# Patient Record
Sex: FEMALE | Race: WHITE | NOT HISPANIC OR LATINO
[De-identification: names, ages, dates, MRNs, and addresses within clinical notes are randomized per-mention and may not be internally consistent; named-entity substitution may affect disease eponyms.]

---

## 2021-02-03 ENCOUNTER — APPOINTMENT (OUTPATIENT)
Dept: ANTEPARTUM | Facility: CLINIC | Age: 41
End: 2021-02-03
Payer: COMMERCIAL

## 2021-02-03 PROCEDURE — 76817 TRANSVAGINAL US OBSTETRIC: CPT

## 2021-02-03 PROCEDURE — 99072 ADDL SUPL MATRL&STAF TM PHE: CPT

## 2021-02-03 PROCEDURE — 76811 OB US DETAILED SNGL FETUS: CPT

## 2021-02-10 ENCOUNTER — APPOINTMENT (OUTPATIENT)
Dept: ANTEPARTUM | Facility: CLINIC | Age: 41
End: 2021-02-10
Payer: COMMERCIAL

## 2021-02-10 ENCOUNTER — ASOB RESULT (OUTPATIENT)
Age: 41
End: 2021-02-10

## 2021-02-10 PROCEDURE — 76816 OB US FOLLOW-UP PER FETUS: CPT

## 2021-02-10 PROCEDURE — 99072 ADDL SUPL MATRL&STAF TM PHE: CPT

## 2021-03-23 ENCOUNTER — ASOB RESULT (OUTPATIENT)
Age: 41
End: 2021-03-23

## 2021-03-23 ENCOUNTER — APPOINTMENT (OUTPATIENT)
Dept: ANTEPARTUM | Facility: CLINIC | Age: 41
End: 2021-03-23
Payer: COMMERCIAL

## 2021-03-23 PROCEDURE — 76819 FETAL BIOPHYS PROFIL W/O NST: CPT

## 2021-03-23 PROCEDURE — 76816 OB US FOLLOW-UP PER FETUS: CPT

## 2021-03-23 PROCEDURE — 99072 ADDL SUPL MATRL&STAF TM PHE: CPT

## 2021-04-27 ENCOUNTER — ASOB RESULT (OUTPATIENT)
Age: 41
End: 2021-04-27

## 2021-04-27 ENCOUNTER — APPOINTMENT (OUTPATIENT)
Dept: ANTEPARTUM | Facility: CLINIC | Age: 41
End: 2021-04-27
Payer: COMMERCIAL

## 2021-04-27 PROCEDURE — 76816 OB US FOLLOW-UP PER FETUS: CPT

## 2021-04-27 PROCEDURE — 99072 ADDL SUPL MATRL&STAF TM PHE: CPT

## 2021-04-27 PROCEDURE — 76818 FETAL BIOPHYS PROFILE W/NST: CPT

## 2021-04-28 ENCOUNTER — OUTPATIENT (OUTPATIENT)
Dept: OUTPATIENT SERVICES | Facility: HOSPITAL | Age: 41
LOS: 1 days | End: 2021-04-28
Payer: COMMERCIAL

## 2021-04-28 DIAGNOSIS — Z3A.00 WEEKS OF GESTATION OF PREGNANCY NOT SPECIFIED: ICD-10-CM

## 2021-04-28 DIAGNOSIS — O26.899 OTHER SPECIFIED PREGNANCY RELATED CONDITIONS, UNSPECIFIED TRIMESTER: ICD-10-CM

## 2021-04-28 LAB
ALBUMIN SERPL ELPH-MCNC: 3.5 G/DL — SIGNIFICANT CHANGE UP (ref 3.3–5)
ALP SERPL-CCNC: 67 U/L — SIGNIFICANT CHANGE UP (ref 40–120)
ALT FLD-CCNC: 18 U/L — SIGNIFICANT CHANGE UP (ref 10–45)
ANION GAP SERPL CALC-SCNC: 12 MMOL/L — SIGNIFICANT CHANGE UP (ref 5–17)
APPEARANCE UR: CLEAR — SIGNIFICANT CHANGE UP
APTT BLD: 24.5 SEC — LOW (ref 27.5–35.5)
AST SERPL-CCNC: 22 U/L — SIGNIFICANT CHANGE UP (ref 10–40)
BASOPHILS # BLD AUTO: 0.04 K/UL — SIGNIFICANT CHANGE UP (ref 0–0.2)
BASOPHILS NFR BLD AUTO: 0.4 % — SIGNIFICANT CHANGE UP (ref 0–2)
BILIRUB SERPL-MCNC: 0.2 MG/DL — SIGNIFICANT CHANGE UP (ref 0.2–1.2)
BILIRUB UR-MCNC: NEGATIVE — SIGNIFICANT CHANGE UP
BUN SERPL-MCNC: 13 MG/DL — SIGNIFICANT CHANGE UP (ref 7–23)
CALCIUM SERPL-MCNC: 9 MG/DL — SIGNIFICANT CHANGE UP (ref 8.4–10.5)
CHLORIDE SERPL-SCNC: 105 MMOL/L — SIGNIFICANT CHANGE UP (ref 96–108)
CO2 SERPL-SCNC: 21 MMOL/L — LOW (ref 22–31)
COLOR SPEC: YELLOW — SIGNIFICANT CHANGE UP
CREAT ?TM UR-MCNC: 145 MG/DL — SIGNIFICANT CHANGE UP
CREAT SERPL-MCNC: 0.7 MG/DL — SIGNIFICANT CHANGE UP (ref 0.5–1.3)
DIFF PNL FLD: NEGATIVE — SIGNIFICANT CHANGE UP
EOSINOPHIL # BLD AUTO: 0.25 K/UL — SIGNIFICANT CHANGE UP (ref 0–0.5)
EOSINOPHIL NFR BLD AUTO: 2.7 % — SIGNIFICANT CHANGE UP (ref 0–6)
FIBRINOGEN PPP-MCNC: 494 MG/DL — HIGH (ref 258–438)
GLUCOSE SERPL-MCNC: 77 MG/DL — SIGNIFICANT CHANGE UP (ref 70–99)
GLUCOSE UR QL: NEGATIVE — SIGNIFICANT CHANGE UP
HCT VFR BLD CALC: 35.5 % — SIGNIFICANT CHANGE UP (ref 34.5–45)
HGB BLD-MCNC: 11.8 G/DL — SIGNIFICANT CHANGE UP (ref 11.5–15.5)
IMM GRANULOCYTES NFR BLD AUTO: 1.3 % — SIGNIFICANT CHANGE UP (ref 0–1.5)
INR BLD: 0.91 — SIGNIFICANT CHANGE UP (ref 0.88–1.16)
KETONES UR-MCNC: 40 MG/DL
LDH SERPL L TO P-CCNC: 141 U/L — SIGNIFICANT CHANGE UP (ref 50–242)
LEUKOCYTE ESTERASE UR-ACNC: NEGATIVE — SIGNIFICANT CHANGE UP
LYMPHOCYTES # BLD AUTO: 2.28 K/UL — SIGNIFICANT CHANGE UP (ref 1–3.3)
LYMPHOCYTES # BLD AUTO: 24.4 % — SIGNIFICANT CHANGE UP (ref 13–44)
MAGNESIUM SERPL-MCNC: 2 MG/DL — SIGNIFICANT CHANGE UP (ref 1.6–2.6)
MCHC RBC-ENTMCNC: 30.6 PG — SIGNIFICANT CHANGE UP (ref 27–34)
MCHC RBC-ENTMCNC: 33.2 GM/DL — SIGNIFICANT CHANGE UP (ref 32–36)
MCV RBC AUTO: 92.2 FL — SIGNIFICANT CHANGE UP (ref 80–100)
MONOCYTES # BLD AUTO: 0.83 K/UL — SIGNIFICANT CHANGE UP (ref 0–0.9)
MONOCYTES NFR BLD AUTO: 8.9 % — SIGNIFICANT CHANGE UP (ref 2–14)
NEUTROPHILS # BLD AUTO: 5.83 K/UL — SIGNIFICANT CHANGE UP (ref 1.8–7.4)
NEUTROPHILS NFR BLD AUTO: 62.3 % — SIGNIFICANT CHANGE UP (ref 43–77)
NITRITE UR-MCNC: NEGATIVE — SIGNIFICANT CHANGE UP
NRBC # BLD: 0 /100 WBCS — SIGNIFICANT CHANGE UP (ref 0–0)
PH UR: 5.5 — SIGNIFICANT CHANGE UP (ref 5–8)
PHOSPHATE SERPL-MCNC: 4 MG/DL — SIGNIFICANT CHANGE UP (ref 2.5–4.5)
PLATELET # BLD AUTO: 185 K/UL — SIGNIFICANT CHANGE UP (ref 150–400)
POTASSIUM SERPL-MCNC: 4 MMOL/L — SIGNIFICANT CHANGE UP (ref 3.5–5.3)
POTASSIUM SERPL-SCNC: 4 MMOL/L — SIGNIFICANT CHANGE UP (ref 3.5–5.3)
PROT ?TM UR-MCNC: 9 MG/DL — SIGNIFICANT CHANGE UP (ref 0–12)
PROT SERPL-MCNC: 6.5 G/DL — SIGNIFICANT CHANGE UP (ref 6–8.3)
PROT UR-MCNC: NEGATIVE MG/DL — SIGNIFICANT CHANGE UP
PROT/CREAT UR-RTO: 0.1 RATIO — SIGNIFICANT CHANGE UP (ref 0–0.2)
PROTHROM AB SERPL-ACNC: 11 SEC — SIGNIFICANT CHANGE UP (ref 10.6–13.6)
RBC # BLD: 3.85 M/UL — SIGNIFICANT CHANGE UP (ref 3.8–5.2)
RBC # FLD: 12.7 % — SIGNIFICANT CHANGE UP (ref 10.3–14.5)
SODIUM SERPL-SCNC: 138 MMOL/L — SIGNIFICANT CHANGE UP (ref 135–145)
SP GR SPEC: 1.02 — SIGNIFICANT CHANGE UP (ref 1–1.03)
URATE SERPL-MCNC: 3.8 MG/DL — SIGNIFICANT CHANGE UP (ref 2.5–7)
UROBILINOGEN FLD QL: 0.2 E.U./DL — SIGNIFICANT CHANGE UP
WBC # BLD: 9.35 K/UL — SIGNIFICANT CHANGE UP (ref 3.8–10.5)
WBC # FLD AUTO: 9.35 K/UL — SIGNIFICANT CHANGE UP (ref 3.8–10.5)

## 2021-04-28 PROCEDURE — 85610 PROTHROMBIN TIME: CPT

## 2021-04-28 PROCEDURE — 36415 COLL VENOUS BLD VENIPUNCTURE: CPT

## 2021-04-28 PROCEDURE — 84100 ASSAY OF PHOSPHORUS: CPT

## 2021-04-28 PROCEDURE — 83615 LACTATE (LD) (LDH) ENZYME: CPT

## 2021-04-28 PROCEDURE — U0003: CPT

## 2021-04-28 PROCEDURE — 99214 OFFICE O/P EST MOD 30 MIN: CPT

## 2021-04-28 PROCEDURE — 80053 COMPREHEN METABOLIC PANEL: CPT

## 2021-04-28 PROCEDURE — 84156 ASSAY OF PROTEIN URINE: CPT

## 2021-04-28 PROCEDURE — 85384 FIBRINOGEN ACTIVITY: CPT

## 2021-04-28 PROCEDURE — U0005: CPT

## 2021-04-28 PROCEDURE — 83735 ASSAY OF MAGNESIUM: CPT

## 2021-04-28 PROCEDURE — 82570 ASSAY OF URINE CREATININE: CPT

## 2021-04-28 PROCEDURE — 85730 THROMBOPLASTIN TIME PARTIAL: CPT

## 2021-04-28 PROCEDURE — 81003 URINALYSIS AUTO W/O SCOPE: CPT

## 2021-04-28 PROCEDURE — 84550 ASSAY OF BLOOD/URIC ACID: CPT

## 2021-04-28 PROCEDURE — 85025 COMPLETE CBC W/AUTO DIFF WBC: CPT

## 2021-04-28 RX ORDER — SODIUM CHLORIDE 9 MG/ML
1000 INJECTION, SOLUTION INTRAVENOUS
Refills: 0 | Status: DISCONTINUED | OUTPATIENT
Start: 2021-04-28 | End: 2021-05-13

## 2021-04-28 RX ORDER — FAMOTIDINE 10 MG/ML
20 INJECTION INTRAVENOUS ONCE
Refills: 0 | Status: COMPLETED | OUTPATIENT
Start: 2021-04-28 | End: 2021-04-28

## 2021-04-28 RX ADMIN — FAMOTIDINE 20 MILLIGRAM(S): 10 INJECTION INTRAVENOUS at 21:24

## 2021-04-28 RX ADMIN — SODIUM CHLORIDE 1000 MILLILITER(S): 9 INJECTION, SOLUTION INTRAVENOUS at 21:25

## 2021-04-29 LAB — SARS-COV-2 RNA SPEC QL NAA+PROBE: SIGNIFICANT CHANGE UP

## 2021-05-12 ENCOUNTER — ASOB RESULT (OUTPATIENT)
Age: 41
End: 2021-05-12

## 2021-05-12 ENCOUNTER — APPOINTMENT (OUTPATIENT)
Dept: ANTEPARTUM | Facility: CLINIC | Age: 41
End: 2021-05-12
Payer: COMMERCIAL

## 2021-05-12 PROCEDURE — 99072 ADDL SUPL MATRL&STAF TM PHE: CPT

## 2021-05-12 PROCEDURE — 76819 FETAL BIOPHYS PROFIL W/O NST: CPT

## 2021-05-12 PROCEDURE — 76816 OB US FOLLOW-UP PER FETUS: CPT

## 2021-05-23 ENCOUNTER — OUTPATIENT (OUTPATIENT)
Dept: OUTPATIENT SERVICES | Facility: HOSPITAL | Age: 41
LOS: 1 days | End: 2021-05-23
Payer: COMMERCIAL

## 2021-05-23 DIAGNOSIS — O26.899 OTHER SPECIFIED PREGNANCY RELATED CONDITIONS, UNSPECIFIED TRIMESTER: ICD-10-CM

## 2021-05-23 DIAGNOSIS — Z3A.00 WEEKS OF GESTATION OF PREGNANCY NOT SPECIFIED: ICD-10-CM

## 2021-05-26 ENCOUNTER — APPOINTMENT (OUTPATIENT)
Dept: ANTEPARTUM | Facility: CLINIC | Age: 41
End: 2021-05-26
Payer: COMMERCIAL

## 2021-05-26 ENCOUNTER — ASOB RESULT (OUTPATIENT)
Age: 41
End: 2021-05-26

## 2021-05-26 PROCEDURE — 99072 ADDL SUPL MATRL&STAF TM PHE: CPT

## 2021-05-26 PROCEDURE — 76818 FETAL BIOPHYS PROFILE W/NST: CPT

## 2021-05-26 PROCEDURE — 76816 OB US FOLLOW-UP PER FETUS: CPT

## 2021-05-27 ENCOUNTER — INPATIENT (INPATIENT)
Facility: HOSPITAL | Age: 41
LOS: 3 days | Discharge: HOME | End: 2021-05-31
Attending: OBSTETRICS & GYNECOLOGY | Admitting: OBSTETRICS & GYNECOLOGY
Payer: COMMERCIAL

## 2021-05-27 VITALS
RESPIRATION RATE: 18 BRPM | SYSTOLIC BLOOD PRESSURE: 119 MMHG | OXYGEN SATURATION: 98 % | HEART RATE: 63 BPM | TEMPERATURE: 98 F | DIASTOLIC BLOOD PRESSURE: 67 MMHG

## 2021-05-27 LAB
ALBUMIN SERPL ELPH-MCNC: 3.3 G/DL — SIGNIFICANT CHANGE UP (ref 3.3–5)
ALP SERPL-CCNC: 81 U/L — SIGNIFICANT CHANGE UP (ref 40–120)
ALT FLD-CCNC: 14 U/L — SIGNIFICANT CHANGE UP (ref 10–45)
ANION GAP SERPL CALC-SCNC: 11 MMOL/L — SIGNIFICANT CHANGE UP (ref 5–17)
APPEARANCE UR: CLEAR — SIGNIFICANT CHANGE UP
APTT BLD: 25.6 SEC — LOW (ref 27.5–35.5)
AST SERPL-CCNC: 26 U/L — SIGNIFICANT CHANGE UP (ref 10–40)
BASOPHILS # BLD AUTO: 0.05 K/UL — SIGNIFICANT CHANGE UP (ref 0–0.2)
BASOPHILS NFR BLD AUTO: 0.6 % — SIGNIFICANT CHANGE UP (ref 0–2)
BILIRUB SERPL-MCNC: 0.2 MG/DL — SIGNIFICANT CHANGE UP (ref 0.2–1.2)
BILIRUB UR-MCNC: NEGATIVE — SIGNIFICANT CHANGE UP
BUN SERPL-MCNC: 9 MG/DL — SIGNIFICANT CHANGE UP (ref 7–23)
CALCIUM SERPL-MCNC: 8.8 MG/DL — SIGNIFICANT CHANGE UP (ref 8.4–10.5)
CHLORIDE SERPL-SCNC: 108 MMOL/L — SIGNIFICANT CHANGE UP (ref 96–108)
CO2 SERPL-SCNC: 21 MMOL/L — LOW (ref 22–31)
COLOR SPEC: YELLOW — SIGNIFICANT CHANGE UP
CREAT SERPL-MCNC: 0.67 MG/DL — SIGNIFICANT CHANGE UP (ref 0.5–1.3)
DIFF PNL FLD: NEGATIVE — SIGNIFICANT CHANGE UP
EOSINOPHIL # BLD AUTO: 0.33 K/UL — SIGNIFICANT CHANGE UP (ref 0–0.5)
EOSINOPHIL NFR BLD AUTO: 3.8 % — SIGNIFICANT CHANGE UP (ref 0–6)
FIBRINOGEN PPP-MCNC: 442 MG/DL — HIGH (ref 258–438)
GLUCOSE SERPL-MCNC: 61 MG/DL — LOW (ref 70–99)
GLUCOSE UR QL: NEGATIVE — SIGNIFICANT CHANGE UP
HCT VFR BLD CALC: 37.6 % — SIGNIFICANT CHANGE UP (ref 34.5–45)
HGB BLD-MCNC: 12.4 G/DL — SIGNIFICANT CHANGE UP (ref 11.5–15.5)
IMM GRANULOCYTES NFR BLD AUTO: 0.6 % — SIGNIFICANT CHANGE UP (ref 0–1.5)
KETONES UR-MCNC: NEGATIVE — SIGNIFICANT CHANGE UP
LEUKOCYTE ESTERASE UR-ACNC: NEGATIVE — SIGNIFICANT CHANGE UP
LYMPHOCYTES # BLD AUTO: 2.53 K/UL — SIGNIFICANT CHANGE UP (ref 1–3.3)
LYMPHOCYTES # BLD AUTO: 28.9 % — SIGNIFICANT CHANGE UP (ref 13–44)
MCHC RBC-ENTMCNC: 30.7 PG — SIGNIFICANT CHANGE UP (ref 27–34)
MCHC RBC-ENTMCNC: 33 GM/DL — SIGNIFICANT CHANGE UP (ref 32–36)
MCV RBC AUTO: 93.1 FL — SIGNIFICANT CHANGE UP (ref 80–100)
MONOCYTES # BLD AUTO: 0.75 K/UL — SIGNIFICANT CHANGE UP (ref 0–0.9)
MONOCYTES NFR BLD AUTO: 8.6 % — SIGNIFICANT CHANGE UP (ref 2–14)
NEUTROPHILS # BLD AUTO: 5.04 K/UL — SIGNIFICANT CHANGE UP (ref 1.8–7.4)
NEUTROPHILS NFR BLD AUTO: 57.5 % — SIGNIFICANT CHANGE UP (ref 43–77)
NITRITE UR-MCNC: NEGATIVE — SIGNIFICANT CHANGE UP
NRBC # BLD: 0 /100 WBCS — SIGNIFICANT CHANGE UP (ref 0–0)
PH UR: 6.5 — SIGNIFICANT CHANGE UP (ref 5–8)
PLATELET # BLD AUTO: 172 K/UL — SIGNIFICANT CHANGE UP (ref 150–400)
POTASSIUM SERPL-MCNC: 3.9 MMOL/L — SIGNIFICANT CHANGE UP (ref 3.5–5.3)
POTASSIUM SERPL-SCNC: 3.9 MMOL/L — SIGNIFICANT CHANGE UP (ref 3.5–5.3)
PROT SERPL-MCNC: 6.1 G/DL — SIGNIFICANT CHANGE UP (ref 6–8.3)
PROT UR-MCNC: NEGATIVE MG/DL — SIGNIFICANT CHANGE UP
RBC # BLD: 4.04 M/UL — SIGNIFICANT CHANGE UP (ref 3.8–5.2)
RBC # FLD: 12.6 % — SIGNIFICANT CHANGE UP (ref 10.3–14.5)
RH IG SCN BLD-IMP: POSITIVE — SIGNIFICANT CHANGE UP
SARS-COV-2 RNA SPEC QL NAA+PROBE: SIGNIFICANT CHANGE UP
SODIUM SERPL-SCNC: 140 MMOL/L — SIGNIFICANT CHANGE UP (ref 135–145)
SP GR SPEC: <=1.005 — SIGNIFICANT CHANGE UP (ref 1–1.03)
URATE SERPL-MCNC: 3.6 MG/DL — SIGNIFICANT CHANGE UP (ref 2.5–7)
UROBILINOGEN FLD QL: 0.2 E.U./DL — SIGNIFICANT CHANGE UP
WBC # BLD: 8.75 K/UL — SIGNIFICANT CHANGE UP (ref 3.8–10.5)
WBC # FLD AUTO: 8.75 K/UL — SIGNIFICANT CHANGE UP (ref 3.8–10.5)

## 2021-05-27 PROCEDURE — U0003: CPT

## 2021-05-27 PROCEDURE — U0005: CPT

## 2021-05-27 PROCEDURE — 99214 OFFICE O/P EST MOD 30 MIN: CPT

## 2021-05-27 RX ORDER — ASPIRIN/CALCIUM CARB/MAGNESIUM 324 MG
81 TABLET ORAL ONCE
Refills: 0 | Status: DISCONTINUED | OUTPATIENT
Start: 2021-05-27 | End: 2021-06-07

## 2021-05-27 RX ORDER — SODIUM CHLORIDE 9 MG/ML
1000 INJECTION, SOLUTION INTRAVENOUS
Refills: 0 | Status: DISCONTINUED | OUTPATIENT
Start: 2021-05-27 | End: 2021-05-27

## 2021-05-27 RX ORDER — SODIUM CHLORIDE 9 MG/ML
1000 INJECTION, SOLUTION INTRAVENOUS
Refills: 0 | Status: DISCONTINUED | OUTPATIENT
Start: 2021-05-27 | End: 2021-05-28

## 2021-05-27 RX ORDER — SODIUM CHLORIDE 9 MG/ML
1000 INJECTION, SOLUTION INTRAVENOUS
Refills: 0 | Status: DISCONTINUED | OUTPATIENT
Start: 2021-05-27 | End: 2021-06-07

## 2021-05-27 RX ORDER — ASPIRIN/CALCIUM CARB/MAGNESIUM 324 MG
81 TABLET ORAL DAILY
Refills: 0 | Status: DISCONTINUED | OUTPATIENT
Start: 2021-05-27 | End: 2021-05-28

## 2021-05-27 RX ORDER — FERROUS SULFATE 325(65) MG
325 TABLET ORAL ONCE
Refills: 0 | Status: DISCONTINUED | OUTPATIENT
Start: 2021-05-27 | End: 2021-06-07

## 2021-05-27 RX ADMIN — SODIUM CHLORIDE 125 MILLILITER(S): 9 INJECTION, SOLUTION INTRAVENOUS at 18:24

## 2021-05-27 NOTE — PATIENT PROFILE OB - NS AS LABOR AMNIOTIC FLD COLOR OB_ANTEPARTUM
[FreeTextEntry1] : Plan of care reviewed with practitioner and I concur with the assessment.\par 
clear

## 2021-05-27 NOTE — PATIENT PROFILE OB - NS_SUPPORTPERSONNAME_OBGYN_ALL_OB_FT
Patient/Caregiver provided printed discharge information.
Henri Obando (received Pfeizer COVID vaccine x 2, 2nd dose > 2 weeks ago)

## 2021-05-28 ENCOUNTER — TRANSCRIPTION ENCOUNTER (OUTPATIENT)
Age: 41
End: 2021-05-28

## 2021-05-28 ENCOUNTER — APPOINTMENT (OUTPATIENT)
Dept: ANTEPARTUM | Facility: CLINIC | Age: 41
End: 2021-05-28

## 2021-05-28 LAB
COVID-19 SPIKE DOMAIN AB INTERP: POSITIVE
COVID-19 SPIKE DOMAIN ANTIBODY RESULT: >250 U/ML — HIGH
SARS-COV-2 IGG+IGM SERPL QL IA: >250 U/ML — HIGH
SARS-COV-2 IGG+IGM SERPL QL IA: POSITIVE
T PALLIDUM AB TITR SER: NEGATIVE — SIGNIFICANT CHANGE UP

## 2021-05-28 RX ORDER — FAMOTIDINE 10 MG/ML
20 INJECTION INTRAVENOUS ONCE
Refills: 0 | Status: DISCONTINUED | OUTPATIENT
Start: 2021-05-28 | End: 2021-05-31

## 2021-05-28 RX ORDER — CALCIUM CARBONATE 500(1250)
2 TABLET ORAL ONCE
Refills: 0 | Status: COMPLETED | OUTPATIENT
Start: 2021-05-28 | End: 2021-05-28

## 2021-05-28 RX ORDER — SODIUM CHLORIDE 9 MG/ML
1000 INJECTION, SOLUTION INTRAVENOUS
Refills: 0 | Status: DISCONTINUED | OUTPATIENT
Start: 2021-05-28 | End: 2021-05-29

## 2021-05-28 RX ADMIN — Medication 81 MILLIGRAM(S): at 12:32

## 2021-05-28 RX ADMIN — Medication 2 TABLET(S): at 12:34

## 2021-05-28 RX ADMIN — SODIUM CHLORIDE 70 MILLILITER(S): 9 INJECTION, SOLUTION INTRAVENOUS at 18:14

## 2021-05-28 RX ADMIN — Medication 1 TABLET(S): at 12:31

## 2021-05-28 RX ADMIN — SODIUM CHLORIDE 125 MILLILITER(S): 9 INJECTION, SOLUTION INTRAVENOUS at 10:00

## 2021-05-29 ENCOUNTER — RESULT REVIEW (OUTPATIENT)
Age: 41
End: 2021-05-29

## 2021-05-29 PROCEDURE — 88307 TISSUE EXAM BY PATHOLOGIST: CPT | Mod: 26

## 2021-05-29 RX ORDER — DIPHENHYDRAMINE HCL 50 MG
25 CAPSULE ORAL EVERY 6 HOURS
Refills: 0 | Status: DISCONTINUED | OUTPATIENT
Start: 2021-05-29 | End: 2021-05-31

## 2021-05-29 RX ORDER — MAGNESIUM HYDROXIDE 400 MG/1
30 TABLET, CHEWABLE ORAL
Refills: 0 | Status: DISCONTINUED | OUTPATIENT
Start: 2021-05-29 | End: 2021-05-31

## 2021-05-29 RX ORDER — OXYCODONE HYDROCHLORIDE 5 MG/1
5 TABLET ORAL ONCE
Refills: 0 | Status: DISCONTINUED | OUTPATIENT
Start: 2021-05-29 | End: 2021-05-31

## 2021-05-29 RX ORDER — LANOLIN
1 OINTMENT (GRAM) TOPICAL EVERY 6 HOURS
Refills: 0 | Status: DISCONTINUED | OUTPATIENT
Start: 2021-05-29 | End: 2021-05-31

## 2021-05-29 RX ORDER — ACETAMINOPHEN 500 MG
975 TABLET ORAL
Refills: 0 | Status: DISCONTINUED | OUTPATIENT
Start: 2021-05-29 | End: 2021-05-31

## 2021-05-29 RX ORDER — KETOROLAC TROMETHAMINE 30 MG/ML
30 SYRINGE (ML) INJECTION EVERY 6 HOURS
Refills: 0 | Status: DISCONTINUED | OUTPATIENT
Start: 2021-05-29 | End: 2021-05-30

## 2021-05-29 RX ORDER — ONDANSETRON 8 MG/1
4 TABLET, FILM COATED ORAL EVERY 6 HOURS
Refills: 0 | Status: DISCONTINUED | OUTPATIENT
Start: 2021-05-29 | End: 2021-05-31

## 2021-05-29 RX ORDER — TETANUS TOXOID, REDUCED DIPHTHERIA TOXOID AND ACELLULAR PERTUSSIS VACCINE, ADSORBED 5; 2.5; 8; 8; 2.5 [IU]/.5ML; [IU]/.5ML; UG/.5ML; UG/.5ML; UG/.5ML
0.5 SUSPENSION INTRAMUSCULAR ONCE
Refills: 0 | Status: DISCONTINUED | OUTPATIENT
Start: 2021-05-29 | End: 2021-05-31

## 2021-05-29 RX ORDER — OXYCODONE HYDROCHLORIDE 5 MG/1
5 TABLET ORAL
Refills: 0 | Status: DISCONTINUED | OUTPATIENT
Start: 2021-05-29 | End: 2021-05-31

## 2021-05-29 RX ORDER — NALOXONE HYDROCHLORIDE 4 MG/.1ML
0.1 SPRAY NASAL
Refills: 0 | Status: DISCONTINUED | OUTPATIENT
Start: 2021-05-29 | End: 2021-05-31

## 2021-05-29 RX ORDER — SODIUM CHLORIDE 9 MG/ML
1000 INJECTION, SOLUTION INTRAVENOUS ONCE
Refills: 0 | Status: COMPLETED | OUTPATIENT
Start: 2021-05-29 | End: 2021-05-29

## 2021-05-29 RX ORDER — OXYTOCIN 10 UNIT/ML
333.33 VIAL (ML) INJECTION
Qty: 20 | Refills: 0 | Status: DISCONTINUED | OUTPATIENT
Start: 2021-05-29 | End: 2021-05-29

## 2021-05-29 RX ORDER — CEFAZOLIN SODIUM 1 G
2000 VIAL (EA) INJECTION ONCE
Refills: 0 | Status: COMPLETED | OUTPATIENT
Start: 2021-05-29 | End: 2021-05-29

## 2021-05-29 RX ORDER — DEXAMETHASONE 0.5 MG/5ML
4 ELIXIR ORAL EVERY 6 HOURS
Refills: 0 | Status: DISCONTINUED | OUTPATIENT
Start: 2021-05-29 | End: 2021-05-31

## 2021-05-29 RX ORDER — OXYTOCIN 10 UNIT/ML
333.33 VIAL (ML) INJECTION
Qty: 20 | Refills: 0 | Status: DISCONTINUED | OUTPATIENT
Start: 2021-05-29 | End: 2021-05-31

## 2021-05-29 RX ORDER — SODIUM CHLORIDE 9 MG/ML
1000 INJECTION, SOLUTION INTRAVENOUS ONCE
Refills: 0 | Status: DISCONTINUED | OUTPATIENT
Start: 2021-05-29 | End: 2021-05-29

## 2021-05-29 RX ORDER — MORPHINE SULFATE 50 MG/1
0.2 CAPSULE, EXTENDED RELEASE ORAL ONCE
Refills: 0 | Status: DISCONTINUED | OUTPATIENT
Start: 2021-05-29 | End: 2021-05-31

## 2021-05-29 RX ORDER — KETOROLAC TROMETHAMINE 30 MG/ML
30 SYRINGE (ML) INJECTION ONCE
Refills: 0 | Status: DISCONTINUED | OUTPATIENT
Start: 2021-05-29 | End: 2021-05-29

## 2021-05-29 RX ORDER — ENOXAPARIN SODIUM 100 MG/ML
40 INJECTION SUBCUTANEOUS EVERY 24 HOURS
Refills: 0 | Status: DISCONTINUED | OUTPATIENT
Start: 2021-05-30 | End: 2021-05-31

## 2021-05-29 RX ORDER — SODIUM CHLORIDE 9 MG/ML
1000 INJECTION, SOLUTION INTRAVENOUS
Refills: 0 | Status: DISCONTINUED | OUTPATIENT
Start: 2021-05-29 | End: 2021-05-31

## 2021-05-29 RX ORDER — SIMETHICONE 80 MG/1
80 TABLET, CHEWABLE ORAL EVERY 4 HOURS
Refills: 0 | Status: DISCONTINUED | OUTPATIENT
Start: 2021-05-29 | End: 2021-05-31

## 2021-05-29 RX ORDER — IBUPROFEN 200 MG
600 TABLET ORAL EVERY 6 HOURS
Refills: 0 | Status: COMPLETED | OUTPATIENT
Start: 2021-05-29 | End: 2022-04-27

## 2021-05-29 RX ORDER — CITRIC ACID/SODIUM CITRATE 300-500 MG
30 SOLUTION, ORAL ORAL ONCE
Refills: 0 | Status: COMPLETED | OUTPATIENT
Start: 2021-05-29 | End: 2021-05-29

## 2021-05-29 RX ORDER — CHLORHEXIDINE GLUCONATE 213 G/1000ML
2 SOLUTION TOPICAL ONCE
Refills: 0 | Status: DISCONTINUED | OUTPATIENT
Start: 2021-05-29 | End: 2021-05-31

## 2021-05-29 RX ADMIN — Medication 30 MILLILITER(S): at 10:21

## 2021-05-29 RX ADMIN — Medication 30 MILLIGRAM(S): at 14:00

## 2021-05-29 RX ADMIN — Medication 30 MILLIGRAM(S): at 21:28

## 2021-05-29 RX ADMIN — Medication 30 MILLIGRAM(S): at 22:00

## 2021-05-29 RX ADMIN — Medication 100 MILLIGRAM(S): at 10:22

## 2021-05-29 RX ADMIN — SIMETHICONE 80 MILLIGRAM(S): 80 TABLET, CHEWABLE ORAL at 18:41

## 2021-05-29 RX ADMIN — SODIUM CHLORIDE 1000 MILLILITER(S): 9 INJECTION, SOLUTION INTRAVENOUS at 08:50

## 2021-05-29 RX ADMIN — Medication 975 MILLIGRAM(S): at 19:10

## 2021-05-29 RX ADMIN — Medication 975 MILLIGRAM(S): at 18:40

## 2021-05-30 LAB
BASOPHILS # BLD AUTO: 0.05 K/UL — SIGNIFICANT CHANGE UP (ref 0–0.2)
BASOPHILS NFR BLD AUTO: 0.4 % — SIGNIFICANT CHANGE UP (ref 0–2)
EOSINOPHIL # BLD AUTO: 0.1 K/UL — SIGNIFICANT CHANGE UP (ref 0–0.5)
EOSINOPHIL NFR BLD AUTO: 0.8 % — SIGNIFICANT CHANGE UP (ref 0–6)
HCT VFR BLD CALC: 32 % — LOW (ref 34.5–45)
HGB BLD-MCNC: 10.7 G/DL — LOW (ref 11.5–15.5)
IMM GRANULOCYTES NFR BLD AUTO: 0.8 % — SIGNIFICANT CHANGE UP (ref 0–1.5)
LYMPHOCYTES # BLD AUTO: 19.5 % — SIGNIFICANT CHANGE UP (ref 13–44)
LYMPHOCYTES # BLD AUTO: 2.59 K/UL — SIGNIFICANT CHANGE UP (ref 1–3.3)
MCHC RBC-ENTMCNC: 30.5 PG — SIGNIFICANT CHANGE UP (ref 27–34)
MCHC RBC-ENTMCNC: 33.4 GM/DL — SIGNIFICANT CHANGE UP (ref 32–36)
MCV RBC AUTO: 91.2 FL — SIGNIFICANT CHANGE UP (ref 80–100)
MONOCYTES # BLD AUTO: 1.07 K/UL — HIGH (ref 0–0.9)
MONOCYTES NFR BLD AUTO: 8.1 % — SIGNIFICANT CHANGE UP (ref 2–14)
NEUTROPHILS # BLD AUTO: 9.35 K/UL — HIGH (ref 1.8–7.4)
NEUTROPHILS NFR BLD AUTO: 70.4 % — SIGNIFICANT CHANGE UP (ref 43–77)
NRBC # BLD: 0 /100 WBCS — SIGNIFICANT CHANGE UP (ref 0–0)
PLATELET # BLD AUTO: 176 K/UL — SIGNIFICANT CHANGE UP (ref 150–400)
RBC # BLD: 3.51 M/UL — LOW (ref 3.8–5.2)
RBC # FLD: 12.7 % — SIGNIFICANT CHANGE UP (ref 10.3–14.5)
WBC # BLD: 13.27 K/UL — HIGH (ref 3.8–10.5)
WBC # FLD AUTO: 13.27 K/UL — HIGH (ref 3.8–10.5)

## 2021-05-30 RX ORDER — IBUPROFEN 200 MG
600 TABLET ORAL EVERY 6 HOURS
Refills: 0 | Status: DISCONTINUED | OUTPATIENT
Start: 2021-05-30 | End: 2021-05-31

## 2021-05-30 RX ADMIN — Medication 975 MILLIGRAM(S): at 11:42

## 2021-05-30 RX ADMIN — Medication 600 MILLIGRAM(S): at 14:50

## 2021-05-30 RX ADMIN — Medication 600 MILLIGRAM(S): at 22:00

## 2021-05-30 RX ADMIN — Medication 975 MILLIGRAM(S): at 12:42

## 2021-05-30 RX ADMIN — Medication 975 MILLIGRAM(S): at 00:24

## 2021-05-30 RX ADMIN — Medication 975 MILLIGRAM(S): at 01:14

## 2021-05-30 RX ADMIN — Medication 30 MILLIGRAM(S): at 08:41

## 2021-05-30 RX ADMIN — SIMETHICONE 80 MILLIGRAM(S): 80 TABLET, CHEWABLE ORAL at 00:25

## 2021-05-30 RX ADMIN — Medication 600 MILLIGRAM(S): at 13:59

## 2021-05-30 RX ADMIN — Medication 975 MILLIGRAM(S): at 06:18

## 2021-05-30 RX ADMIN — Medication 1 APPLICATION(S): at 06:20

## 2021-05-30 RX ADMIN — Medication 600 MILLIGRAM(S): at 21:05

## 2021-05-30 RX ADMIN — Medication 30 MILLIGRAM(S): at 09:28

## 2021-05-30 RX ADMIN — SIMETHICONE 80 MILLIGRAM(S): 80 TABLET, CHEWABLE ORAL at 06:19

## 2021-05-30 RX ADMIN — Medication 30 MILLIGRAM(S): at 03:33

## 2021-05-30 RX ADMIN — Medication 30 MILLIGRAM(S): at 04:00

## 2021-05-30 RX ADMIN — Medication 975 MILLIGRAM(S): at 18:15

## 2021-05-30 RX ADMIN — Medication 975 MILLIGRAM(S): at 17:28

## 2021-05-30 RX ADMIN — Medication 975 MILLIGRAM(S): at 07:00

## 2021-05-30 RX ADMIN — ENOXAPARIN SODIUM 40 MILLIGRAM(S): 100 INJECTION SUBCUTANEOUS at 07:02

## 2021-05-30 NOTE — LACTATION INITIAL EVALUATION - NS LACT CON REASON FOR REQ
37 wk gestation baby girl, seen just under 24 hrs of life. Baby presents with 3% wt loss and has had 4 wet and 3 dirty diapers since birth. Baby placed skin to skin on mothers chest and began rooting towards left breast. In a laid-back cradle hold, baby deeply attached, took a few strong sucks and then became sleepy. Baby continues to be sleepy at breast but has ocassional suck cycles with stim and swallows audible during those times. Mother confirms a strong pull of the nipple and denies discomfort. Complete breastfeeding education was provided to primip mom, manual expression technique taught with proper return demo and benefits of doing so explained. Colostrum easily expressible bilaterally and drops collected onto spoon to be offered to the baby, and mother advised to perform hand expression in addition to feeds at breast to help meet babies nutritional needs due to gestational age.  Responsive/ frequent feeds, continued/ increased SSC and rooming-in were encouraged. All questions were answered, mother verbalized understanding of teaching and info given./primaparous mom

## 2021-05-30 NOTE — PROGRESS NOTE ADULT - ATTENDING COMMENTS
Patient seen and examined at bedside. Patient has no current complaints. Not yet passing gas, has voided and is ambulating. Continue routine postop care. Patient to be discharge POD2 or 3 if patient remains hemodynamically stable

## 2021-05-31 ENCOUNTER — TRANSCRIPTION ENCOUNTER (OUTPATIENT)
Age: 41
End: 2021-05-31

## 2021-05-31 VITALS
HEART RATE: 76 BPM | TEMPERATURE: 98 F | DIASTOLIC BLOOD PRESSURE: 77 MMHG | SYSTOLIC BLOOD PRESSURE: 117 MMHG | OXYGEN SATURATION: 97 %

## 2021-05-31 PROCEDURE — 85025 COMPLETE CBC W/AUTO DIFF WBC: CPT

## 2021-05-31 PROCEDURE — 86900 BLOOD TYPING SEROLOGIC ABO: CPT

## 2021-05-31 PROCEDURE — 86780 TREPONEMA PALLIDUM: CPT

## 2021-05-31 PROCEDURE — 81003 URINALYSIS AUTO W/O SCOPE: CPT

## 2021-05-31 PROCEDURE — 80053 COMPREHEN METABOLIC PANEL: CPT

## 2021-05-31 PROCEDURE — 88307 TISSUE EXAM BY PATHOLOGIST: CPT

## 2021-05-31 PROCEDURE — 85384 FIBRINOGEN ACTIVITY: CPT

## 2021-05-31 PROCEDURE — 84550 ASSAY OF BLOOD/URIC ACID: CPT

## 2021-05-31 PROCEDURE — 86850 RBC ANTIBODY SCREEN: CPT

## 2021-05-31 PROCEDURE — 85730 THROMBOPLASTIN TIME PARTIAL: CPT

## 2021-05-31 PROCEDURE — 36415 COLL VENOUS BLD VENIPUNCTURE: CPT

## 2021-05-31 PROCEDURE — 86901 BLOOD TYPING SEROLOGIC RH(D): CPT

## 2021-05-31 PROCEDURE — 86769 SARS-COV-2 COVID-19 ANTIBODY: CPT

## 2021-05-31 RX ORDER — ACETAMINOPHEN 500 MG
3 TABLET ORAL
Qty: 0 | Refills: 0 | DISCHARGE
Start: 2021-05-31

## 2021-05-31 RX ORDER — IBUPROFEN 200 MG
1 TABLET ORAL
Qty: 0 | Refills: 0 | DISCHARGE
Start: 2021-05-31

## 2021-05-31 RX ADMIN — Medication 975 MILLIGRAM(S): at 11:44

## 2021-05-31 RX ADMIN — Medication 975 MILLIGRAM(S): at 05:55

## 2021-05-31 RX ADMIN — Medication 975 MILLIGRAM(S): at 00:04

## 2021-05-31 RX ADMIN — Medication 975 MILLIGRAM(S): at 06:55

## 2021-05-31 RX ADMIN — Medication 600 MILLIGRAM(S): at 03:07

## 2021-05-31 RX ADMIN — ENOXAPARIN SODIUM 40 MILLIGRAM(S): 100 INJECTION SUBCUTANEOUS at 07:12

## 2021-05-31 RX ADMIN — Medication 600 MILLIGRAM(S): at 08:34

## 2021-05-31 RX ADMIN — Medication 600 MILLIGRAM(S): at 04:00

## 2021-05-31 RX ADMIN — Medication 600 MILLIGRAM(S): at 09:30

## 2021-05-31 RX ADMIN — Medication 975 MILLIGRAM(S): at 01:00

## 2021-05-31 NOTE — DISCHARGE NOTE OB - CARE PROVIDER_API CALL
Carolyne Ramirez (DO; MS)  [facility]  [facility] [address] [place] [address]  Phone: [phone]  Fax: [phone]  Follow Up Time:

## 2021-05-31 NOTE — DISCHARGE NOTE OB - PLAN OF CARE
postpartum care 41y female s/p  section, post operative day 2, meeting all postpartum milestones. No heavy lifting. Pelvic rest until cleared. Follow-up with obstetrician in 1-2 weeks.

## 2021-05-31 NOTE — DISCHARGE NOTE OB - PATIENT PORTAL LINK FT
You can access the FollowMyHealth Patient Portal offered by [facility] by registering at the following website: [email or web address]. By joining [facility]’s FollowMyHealth portal, you will also be able to view your health information using other applications (apps) compatible with our system.

## 2021-05-31 NOTE — DISCHARGE NOTE OB - CARE PLAN
Principal Discharge DX:	Postpartum state  Goal:	postpartum care  Assessment and plan of treatment:	41y female s/p  section, post operative day 2, meeting all postpartum milestones. No heavy lifting. Pelvic rest until cleared. Follow-up with obstetrician in 1-2 weeks.

## 2021-05-31 NOTE — PROGRESS NOTE ADULT - ASSESSMENT
41y Female POD#1    s/p C/S, Uncomplicated post operative Hgb 10.7                                      1. Neuro/Pain:  OPM  2  CV:  VS per routine  3. Pulm: Encourage ISS & Ambulation  4. GI:  Reg  5. : Voiding  6. DVT ppx: SCDs, lovenox qD  7. Dispo: POD #2 or 3
41y Female POD#2   s/p C/S, Uncomplicated post operative Hgb 10.7                                      1. Neuro/Pain:  OPM  2  CV:  VS per routine  3. Pulm: Encourage ISS & Ambulation  4. GI:  Reg  5. : Voiding  6. DVT ppx: SCDs, lovenox qD  7. Dispo: POD #2 or 3

## 2021-05-31 NOTE — PROGRESS NOTE ADULT - SUBJECTIVE AND OBJECTIVE BOX
Patient evaluated at bedside.   She reports pain is well controlled with OPM.  She has been ambulating without assistance, voiding spontaneously, passing gas, tolerating regular diet    She denies HA, dizziness, CP, palpitations, SOB, n/v, or heavy vaginal bleeding.    Physical Exam:  Vital Signs Last 24 Hrs  T(C): 36.7 (31 May 2021 06:03), Max: 36.8 (30 May 2021 22:55)  T(F): 98.1 (31 May 2021 06:03), Max: 98.3 (30 May 2021 22:55)  HR: 76 (31 May 2021 06:03) (67 - 76)  BP: 117/77 (31 May 2021 06:03) (107/68 - 117/77)  BP(mean): --  RR: 17 (30 May 2021 22:55) (17 - 18)  SpO2: 97% (31 May 2021 06:03) (97% - 98%)    Gen: NAD  Abd: + BS, soft, nontender, nondistended, no rebound or guarding  Incision clean, dry and intact  uterus firm at midline  : lochia WNL  Extremities: no swelling or calf tenderness                          10.7   13.27 )-----------( 176      ( 30 May 2021 06:24 )             32.0       MEDICATIONS  (STANDING):  acetaminophen   Tablet .. 975 milliGRAM(s) Oral <User Schedule>  chlorhexidine 2% Cloths 2 Application(s) Topical once  diphtheria/tetanus/pertussis (acellular) Vaccine (ADAcel) 0.5 milliLiter(s) IntraMuscular once  enoxaparin Injectable 40 milliGRAM(s) SubCutaneous every 24 hours  famotidine Injectable 20 milliGRAM(s) IV Push once  ibuprofen  Tablet. 600 milliGRAM(s) Oral every 6 hours  lactated ringers. 1000 milliLiter(s) (125 mL/Hr) IV Continuous <Continuous>  morphine PF Spinal 0.2 milliGRAM(s) IntraThecal. once  oxytocin Infusion 333.333 milliUNIT(s)/Min (1000 mL/Hr) IV Continuous <Continuous>  prenatal multivitamin 1 Tablet(s) Oral daily    MEDICATIONS  (PRN):  dexAMETHasone  Injectable 4 milliGRAM(s) IV Push every 6 hours PRN Nausea  diphenhydrAMINE 25 milliGRAM(s) Oral every 6 hours PRN Pruritus  lanolin Ointment 1 Application(s) Topical every 6 hours PRN Sore Nipples  magnesium hydroxide Suspension 30 milliLiter(s) Oral two times a day PRN Constipation  naloxone Injectable 0.1 milliGRAM(s) IV Push every 3 minutes PRN For ANY of the following changes in patient status:  A. Breaths Per Minute LESS THAN 10, B. Oxygen saturation LESS THAN 90%, C. Sedation score of 6 for Stop After: 4 Times  ondansetron Injectable 4 milliGRAM(s) IV Push every 6 hours PRN Nausea  oxyCODONE    IR 5 milliGRAM(s) Oral every 3 hours PRN Moderate to Severe Pain (4-10)  oxyCODONE    IR 5 milliGRAM(s) Oral once PRN Moderate to Severe Pain (4-10)  simethicone 80 milliGRAM(s) Chew every 4 hours PRN Gas          
Patient evaluated at bedside.   She reports pain is well controlled.  s/o bertrand- voiding without issue  No Flatus      She denies HA, dizziness, CP, palpitations, SOB, n/v, or heavy vaginal bleeding.    Physical Exam:  Vital Signs Last 24 Hrs  T(C): 36.6 (30 May 2021 06:20), Max: 36.8 (30 May 2021 02:05)  T(F): 97.9 (30 May 2021 06:20), Max: 98.2 (30 May 2021 02:05)  HR: 68 (30 May 2021 06:20) (58 - 74)  BP: 108/71 (30 May 2021 06:20) (103/57 - 124/58)  BP(mean): 84 (30 May 2021 06:20) (76 - 84)  RR: 17 (30 May 2021 06:20) (16 - 18)  SpO2: 98% (30 May 2021 06:20) (95% - 100%)    Gen: NAD  Abd: + BS, soft, nontender, nondistended, no rebound or guarding  Incision clean, dry and intact  uterus firm at midline  : lochia WNL  Extremities: +1 calf and foot edema b/l, negative homans sign                          10.7   13.27 )-----------( 176      ( 30 May 2021 06:24 )             32.0       MEDICATIONS  (STANDING):  acetaminophen   Tablet .. 975 milliGRAM(s) Oral <User Schedule>  chlorhexidine 2% Cloths 2 Application(s) Topical once  diphtheria/tetanus/pertussis (acellular) Vaccine (ADAcel) 0.5 milliLiter(s) IntraMuscular once  enoxaparin Injectable 40 milliGRAM(s) SubCutaneous every 24 hours  famotidine Injectable 20 milliGRAM(s) IV Push once  ibuprofen  Tablet. 600 milliGRAM(s) Oral every 6 hours  ketorolac   Injectable 30 milliGRAM(s) IV Push every 6 hours  lactated ringers. 1000 milliLiter(s) (125 mL/Hr) IV Continuous <Continuous>  morphine PF Spinal 0.2 milliGRAM(s) IntraThecal. once  oxytocin Infusion 333.333 milliUNIT(s)/Min (1000 mL/Hr) IV Continuous <Continuous>  prenatal multivitamin 1 Tablet(s) Oral daily    MEDICATIONS  (PRN):  dexAMETHasone  Injectable 4 milliGRAM(s) IV Push every 6 hours PRN Nausea  diphenhydrAMINE 25 milliGRAM(s) Oral every 6 hours PRN Pruritus  lanolin Ointment 1 Application(s) Topical every 6 hours PRN Sore Nipples  magnesium hydroxide Suspension 30 milliLiter(s) Oral two times a day PRN Constipation  naloxone Injectable 0.1 milliGRAM(s) IV Push every 3 minutes PRN For ANY of the following changes in patient status:  A. Breaths Per Minute LESS THAN 10, B. Oxygen saturation LESS THAN 90%, C. Sedation score of 6 for Stop After: 4 Times  ondansetron Injectable 4 milliGRAM(s) IV Push every 6 hours PRN Nausea  oxyCODONE    IR 5 milliGRAM(s) Oral every 3 hours PRN Moderate to Severe Pain (4-10)  oxyCODONE    IR 5 milliGRAM(s) Oral once PRN Moderate to Severe Pain (4-10)  simethicone 80 milliGRAM(s) Chew every 4 hours PRN Gas          
no guarding/no rigidity/no distention/no masses palpable/normal/soft/no rebound tenderness/bowel sounds normal/nontender

## 2021-06-01 PROBLEM — Z00.00 ENCOUNTER FOR PREVENTIVE HEALTH EXAMINATION: Status: ACTIVE | Noted: 2021-06-01

## 2021-06-04 LAB — SURGICAL PATHOLOGY STUDY: SIGNIFICANT CHANGE UP

## 2021-06-05 DIAGNOSIS — Z3A.36 36 WEEKS GESTATION OF PREGNANCY: ICD-10-CM

## 2021-06-05 DIAGNOSIS — D25.9 LEIOMYOMA OF UTERUS, UNSPECIFIED: ICD-10-CM

## 2021-06-05 DIAGNOSIS — Z79.82 LONG TERM (CURRENT) USE OF ASPIRIN: ICD-10-CM

## 2021-06-05 DIAGNOSIS — O32.8XX0 MATERNAL CARE FOR OTHER MALPRESENTATION OF FETUS, NOT APPLICABLE OR UNSPECIFIED: ICD-10-CM

## 2021-06-05 DIAGNOSIS — O34.593 MATERNAL CARE FOR OTHER ABNORMALITIES OF GRAVID UTERUS, THIRD TRIMESTER: ICD-10-CM

## 2021-06-05 DIAGNOSIS — N80.0 ENDOMETRIOSIS OF UTERUS: ICD-10-CM

## 2021-06-05 DIAGNOSIS — O41.03X0 OLIGOHYDRAMNIOS, THIRD TRIMESTER, NOT APPLICABLE OR UNSPECIFIED: ICD-10-CM

## 2021-06-05 DIAGNOSIS — Z28.09 IMMUNIZATION NOT CARRIED OUT BECAUSE OF OTHER CONTRAINDICATION: ICD-10-CM

## 2021-06-05 DIAGNOSIS — O34.13 MATERNAL CARE FOR BENIGN TUMOR OF CORPUS UTERI, THIRD TRIMESTER: ICD-10-CM

## 2021-06-05 DIAGNOSIS — Z34.83 ENCOUNTER FOR SUPERVISION OF OTHER NORMAL PREGNANCY, THIRD TRIMESTER: ICD-10-CM

## 2021-06-05 DIAGNOSIS — A60.09 HERPESVIRAL INFECTION OF OTHER UROGENITAL TRACT: ICD-10-CM

## 2021-09-18 NOTE — DISCHARGE NOTE OB - PAIN IN THE CALVES OF YOUR LEGS
Pt presents to the ED c/o  right leg pain that has been going on for about a month, worse the day before ED arrival.  Had some pain in his calf, ankle, midfoot.  Pain worse with movement.  Similar to previous gout pain.  Patient was admitted to the ED obs unit for observation and or orthopedic consult to clear him of any concerns for intra-articular involvement to the concerns for overlying cellulitis.     On exam,   General: Awake, alert, no acute distress  HEENT: EOMI  Pulm: Symmetric chest rise, nonlabored breathing  CV: Regular rate and rhythm  GI: Non-distended  MSK: No deformity, slight edema in the right foot and ankle compared to the left with some very mild overlying heat compared to the left.    Skin: Warm, dry  Neuro: Alert and oriented x 3, moving all extremities, no focal deficits  Psych: Calm, cooperative    Vital signs and nursing notes reviewed.       N95, protective eye goggles, and gloves used during this encounter. Patient in surgical mask.      Plan: Orthopedics has evaluated and cleared the patient from a septic joint standpoint.  They felt this is more likely gout, recommended a Medrol Dosepak.  Patient family have been updated on the course and plan, all questions and concerns addressed, and he is safe for discharge at this time.  PCP and orthopedic follow-up, ED return for worsening symptoms as needed.           Attestation:  The LUC and I have discussed this patient's history, physical exam, and treatment plan.  I have reviewed the documentation and personally had a face to face interaction with the patient. I affirm the documentation and agree with the treatment and plan.  The attached note describes my personal findings.            Statement Selected

## 2021-11-11 ENCOUNTER — RESULT REVIEW (OUTPATIENT)
Age: 41
End: 2021-11-11

## 2024-01-24 ENCOUNTER — APPOINTMENT (OUTPATIENT)
Dept: ANTEPARTUM | Facility: CLINIC | Age: 44
End: 2024-01-24
Payer: COMMERCIAL

## 2024-01-24 ENCOUNTER — ASOB RESULT (OUTPATIENT)
Age: 44
End: 2024-01-24

## 2024-01-24 PROCEDURE — 93976 VASCULAR STUDY: CPT

## 2024-01-24 PROCEDURE — 36415 COLL VENOUS BLD VENIPUNCTURE: CPT

## 2024-01-24 PROCEDURE — 76813 OB US NUCHAL MEAS 1 GEST: CPT

## 2024-02-14 ENCOUNTER — APPOINTMENT (OUTPATIENT)
Dept: ANTEPARTUM | Facility: CLINIC | Age: 44
End: 2024-02-14
Payer: COMMERCIAL

## 2024-02-14 ENCOUNTER — ASOB RESULT (OUTPATIENT)
Age: 44
End: 2024-02-14

## 2024-02-14 PROCEDURE — 76817 TRANSVAGINAL US OBSTETRIC: CPT

## 2024-02-14 PROCEDURE — 76805 OB US >/= 14 WKS SNGL FETUS: CPT

## 2024-02-28 ENCOUNTER — ASOB RESULT (OUTPATIENT)
Age: 44
End: 2024-02-28

## 2024-02-28 ENCOUNTER — APPOINTMENT (OUTPATIENT)
Dept: ANTEPARTUM | Facility: CLINIC | Age: 44
End: 2024-02-28
Payer: COMMERCIAL

## 2024-02-28 PROCEDURE — 76946 ECHO GUIDE FOR AMNIOCENTESIS: CPT

## 2024-02-28 PROCEDURE — 59000 AMNIOCENTESIS DIAGNOSTIC: CPT

## 2024-02-28 PROCEDURE — 76815 OB US LIMITED FETUS(S): CPT

## 2024-03-22 ENCOUNTER — APPOINTMENT (OUTPATIENT)
Dept: ANTEPARTUM | Facility: CLINIC | Age: 44
End: 2024-03-22
Payer: COMMERCIAL

## 2024-03-22 ENCOUNTER — ASOB RESULT (OUTPATIENT)
Age: 44
End: 2024-03-22

## 2024-03-22 PROCEDURE — 76811 OB US DETAILED SNGL FETUS: CPT

## 2024-03-22 PROCEDURE — 76817 TRANSVAGINAL US OBSTETRIC: CPT

## 2024-05-06 ENCOUNTER — OUTPATIENT (OUTPATIENT)
Dept: OUTPATIENT SERVICES | Facility: HOSPITAL | Age: 44
LOS: 1 days | End: 2024-05-06
Payer: COMMERCIAL

## 2024-05-06 VITALS
TEMPERATURE: 98 F | SYSTOLIC BLOOD PRESSURE: 115 MMHG | HEART RATE: 79 BPM | OXYGEN SATURATION: 79 % | DIASTOLIC BLOOD PRESSURE: 63 MMHG | RESPIRATION RATE: 20 BRPM

## 2024-05-06 DIAGNOSIS — O36.8120 DECREASED FETAL MOVEMENTS, SECOND TRIMESTER, NOT APPLICABLE OR UNSPECIFIED: ICD-10-CM

## 2024-05-06 DIAGNOSIS — M79.89 OTHER SPECIFIED SOFT TISSUE DISORDERS: ICD-10-CM

## 2024-05-06 DIAGNOSIS — Z98.891 HISTORY OF UTERINE SCAR FROM PREVIOUS SURGERY: Chronic | ICD-10-CM

## 2024-05-06 DIAGNOSIS — O26.899 OTHER SPECIFIED PREGNANCY RELATED CONDITIONS, UNSPECIFIED TRIMESTER: ICD-10-CM

## 2024-05-06 DIAGNOSIS — O99.891 OTHER SPECIFIED DISEASES AND CONDITIONS COMPLICATING PREGNANCY: ICD-10-CM

## 2024-05-06 DIAGNOSIS — Z3A.27 27 WEEKS GESTATION OF PREGNANCY: ICD-10-CM

## 2024-05-06 DIAGNOSIS — O09.522 SUPERVISION OF ELDERLY MULTIGRAVIDA, SECOND TRIMESTER: ICD-10-CM

## 2024-05-06 DIAGNOSIS — O09.292 SUPERVISION OF PREGNANCY WITH OTHER POOR REPRODUCTIVE OR OBSTETRIC HISTORY, SECOND TRIMESTER: ICD-10-CM

## 2024-05-06 DIAGNOSIS — R51.9 HEADACHE, UNSPECIFIED: ICD-10-CM

## 2024-05-06 DIAGNOSIS — O26.892 OTHER SPECIFIED PREGNANCY RELATED CONDITIONS, SECOND TRIMESTER: ICD-10-CM

## 2024-05-06 DIAGNOSIS — O34.219 MATERNAL CARE FOR UNSPECIFIED TYPE SCAR FROM PREVIOUS CESAREAN DELIVERY: ICD-10-CM

## 2024-05-06 DIAGNOSIS — Z87.59 PERSONAL HISTORY OF OTHER COMPLICATIONS OF PREGNANCY, CHILDBIRTH AND THE PUERPERIUM: ICD-10-CM

## 2024-05-06 LAB
ALBUMIN SERPL ELPH-MCNC: 3.6 G/DL — SIGNIFICANT CHANGE UP (ref 3.3–5)
ALP SERPL-CCNC: 50 U/L — SIGNIFICANT CHANGE UP (ref 40–120)
ALT FLD-CCNC: 7 U/L — LOW (ref 10–45)
ANION GAP SERPL CALC-SCNC: 9 MMOL/L — SIGNIFICANT CHANGE UP (ref 5–17)
APPEARANCE UR: CLEAR — SIGNIFICANT CHANGE UP
AST SERPL-CCNC: 12 U/L — SIGNIFICANT CHANGE UP (ref 10–40)
BACTERIA # UR AUTO: NEGATIVE /HPF — SIGNIFICANT CHANGE UP
BASOPHILS # BLD AUTO: 0.02 K/UL — SIGNIFICANT CHANGE UP (ref 0–0.2)
BASOPHILS NFR BLD AUTO: 0.3 % — SIGNIFICANT CHANGE UP (ref 0–2)
BILIRUB SERPL-MCNC: 0.2 MG/DL — SIGNIFICANT CHANGE UP (ref 0.2–1.2)
BILIRUB UR-MCNC: NEGATIVE — SIGNIFICANT CHANGE UP
BUN SERPL-MCNC: 11 MG/DL — SIGNIFICANT CHANGE UP (ref 7–23)
CALCIUM SERPL-MCNC: 9 MG/DL — SIGNIFICANT CHANGE UP (ref 8.4–10.5)
CHLORIDE SERPL-SCNC: 106 MMOL/L — SIGNIFICANT CHANGE UP (ref 96–108)
CO2 SERPL-SCNC: 20 MMOL/L — LOW (ref 22–31)
COLOR SPEC: YELLOW — SIGNIFICANT CHANGE UP
CREAT ?TM UR-MCNC: 16 MG/DL — SIGNIFICANT CHANGE UP
CREAT SERPL-MCNC: 0.52 MG/DL — SIGNIFICANT CHANGE UP (ref 0.5–1.3)
DIFF PNL FLD: NEGATIVE — SIGNIFICANT CHANGE UP
EGFR: 117 ML/MIN/1.73M2 — SIGNIFICANT CHANGE UP
EOSINOPHIL # BLD AUTO: 0.19 K/UL — SIGNIFICANT CHANGE UP (ref 0–0.5)
EOSINOPHIL NFR BLD AUTO: 2.6 % — SIGNIFICANT CHANGE UP (ref 0–6)
GLUCOSE SERPL-MCNC: 86 MG/DL — SIGNIFICANT CHANGE UP (ref 70–99)
GLUCOSE UR QL: NEGATIVE MG/DL — SIGNIFICANT CHANGE UP
HCT VFR BLD CALC: 33.4 % — LOW (ref 34.5–45)
HGB BLD-MCNC: 11.4 G/DL — LOW (ref 11.5–15.5)
IMM GRANULOCYTES NFR BLD AUTO: 0.8 % — SIGNIFICANT CHANGE UP (ref 0–0.9)
KETONES UR-MCNC: NEGATIVE MG/DL — SIGNIFICANT CHANGE UP
LDH SERPL L TO P-CCNC: 125 U/L — SIGNIFICANT CHANGE UP (ref 50–242)
LEUKOCYTE ESTERASE UR-ACNC: ABNORMAL
LYMPHOCYTES # BLD AUTO: 1.71 K/UL — SIGNIFICANT CHANGE UP (ref 1–3.3)
LYMPHOCYTES # BLD AUTO: 23.8 % — SIGNIFICANT CHANGE UP (ref 13–44)
MCHC RBC-ENTMCNC: 30 PG — SIGNIFICANT CHANGE UP (ref 27–34)
MCHC RBC-ENTMCNC: 34.1 GM/DL — SIGNIFICANT CHANGE UP (ref 32–36)
MCV RBC AUTO: 87.9 FL — SIGNIFICANT CHANGE UP (ref 80–100)
MONOCYTES # BLD AUTO: 0.47 K/UL — SIGNIFICANT CHANGE UP (ref 0–0.9)
MONOCYTES NFR BLD AUTO: 6.5 % — SIGNIFICANT CHANGE UP (ref 2–14)
NEUTROPHILS # BLD AUTO: 4.73 K/UL — SIGNIFICANT CHANGE UP (ref 1.8–7.4)
NEUTROPHILS NFR BLD AUTO: 66 % — SIGNIFICANT CHANGE UP (ref 43–77)
NITRITE UR-MCNC: NEGATIVE — SIGNIFICANT CHANGE UP
NRBC # BLD: 0 /100 WBCS — SIGNIFICANT CHANGE UP (ref 0–0)
PH UR: 6.5 — SIGNIFICANT CHANGE UP (ref 5–8)
PLATELET # BLD AUTO: 185 K/UL — SIGNIFICANT CHANGE UP (ref 150–400)
POTASSIUM SERPL-MCNC: 3.7 MMOL/L — SIGNIFICANT CHANGE UP (ref 3.5–5.3)
POTASSIUM SERPL-SCNC: 3.7 MMOL/L — SIGNIFICANT CHANGE UP (ref 3.5–5.3)
PROT ?TM UR-MCNC: 5 MG/DL — SIGNIFICANT CHANGE UP (ref 0–12)
PROT SERPL-MCNC: 6.1 G/DL — SIGNIFICANT CHANGE UP (ref 6–8.3)
PROT UR-MCNC: NEGATIVE MG/DL — SIGNIFICANT CHANGE UP
PROT/CREAT UR-RTO: 0.3 RATIO — HIGH (ref 0–0.2)
RBC # BLD: 3.8 M/UL — SIGNIFICANT CHANGE UP (ref 3.8–5.2)
RBC # FLD: 13.3 % — SIGNIFICANT CHANGE UP (ref 10.3–14.5)
RBC CASTS # UR COMP ASSIST: 0 /HPF — SIGNIFICANT CHANGE UP (ref 0–4)
SODIUM SERPL-SCNC: 135 MMOL/L — SIGNIFICANT CHANGE UP (ref 135–145)
SP GR SPEC: 1 — SIGNIFICANT CHANGE UP (ref 1–1.03)
SQUAMOUS # UR AUTO: 4 /HPF — SIGNIFICANT CHANGE UP (ref 0–5)
URATE SERPL-MCNC: 3 MG/DL — SIGNIFICANT CHANGE UP (ref 2.5–7)
UROBILINOGEN FLD QL: 0.2 MG/DL — SIGNIFICANT CHANGE UP (ref 0.2–1)
WBC # BLD: 7.18 K/UL — SIGNIFICANT CHANGE UP (ref 3.8–10.5)
WBC # FLD AUTO: 7.18 K/UL — SIGNIFICANT CHANGE UP (ref 3.8–10.5)
WBC UR QL: 11 /HPF — HIGH (ref 0–5)

## 2024-05-06 PROCEDURE — 80053 COMPREHEN METABOLIC PANEL: CPT

## 2024-05-06 PROCEDURE — 96361 HYDRATE IV INFUSION ADD-ON: CPT

## 2024-05-06 PROCEDURE — 96374 THER/PROPH/DIAG INJ IV PUSH: CPT

## 2024-05-06 PROCEDURE — 59025 FETAL NON-STRESS TEST: CPT | Mod: 26

## 2024-05-06 PROCEDURE — 84156 ASSAY OF PROTEIN URINE: CPT

## 2024-05-06 PROCEDURE — 36415 COLL VENOUS BLD VENIPUNCTURE: CPT

## 2024-05-06 PROCEDURE — 85025 COMPLETE CBC W/AUTO DIFF WBC: CPT

## 2024-05-06 PROCEDURE — 84550 ASSAY OF BLOOD/URIC ACID: CPT

## 2024-05-06 PROCEDURE — 82570 ASSAY OF URINE CREATININE: CPT

## 2024-05-06 PROCEDURE — 59025 FETAL NON-STRESS TEST: CPT

## 2024-05-06 PROCEDURE — 83615 LACTATE (LD) (LDH) ENZYME: CPT

## 2024-05-06 PROCEDURE — 99222 1ST HOSP IP/OBS MODERATE 55: CPT | Mod: 25

## 2024-05-06 PROCEDURE — 81001 URINALYSIS AUTO W/SCOPE: CPT

## 2024-05-06 PROCEDURE — 99214 OFFICE O/P EST MOD 30 MIN: CPT

## 2024-05-06 PROCEDURE — 96375 TX/PRO/DX INJ NEW DRUG ADDON: CPT

## 2024-05-06 RX ORDER — ACETAMINOPHEN 500 MG
1000 TABLET ORAL ONCE
Refills: 0 | Status: COMPLETED | OUTPATIENT
Start: 2024-05-06 | End: 2024-05-06

## 2024-05-06 RX ORDER — SODIUM CHLORIDE 9 MG/ML
1000 INJECTION, SOLUTION INTRAVENOUS ONCE
Refills: 0 | Status: COMPLETED | OUTPATIENT
Start: 2024-05-06 | End: 2024-05-06

## 2024-05-06 RX ORDER — METOCLOPRAMIDE HCL 10 MG
10 TABLET ORAL ONCE
Refills: 0 | Status: COMPLETED | OUTPATIENT
Start: 2024-05-06 | End: 2024-05-06

## 2024-05-06 RX ADMIN — Medication 10 MILLIGRAM(S): at 14:50

## 2024-05-06 RX ADMIN — SODIUM CHLORIDE 1000 MILLILITER(S): 9 INJECTION, SOLUTION INTRAVENOUS at 13:45

## 2024-05-06 RX ADMIN — Medication 400 MILLIGRAM(S): at 13:50

## 2024-05-06 NOTE — OB PROVIDER TRIAGE NOTE - HISTORY OF PRESENT ILLNESS
Pt is a 44y  @ 27w0d presenting w/ headache and decreased fetal movement since Friday.  Patient states she did not take any medication for her headache. She denies associated changes in vision, SOB/CP, RUQ pain. pt endorses some minor new onset feet swelling. Denies having done a 24hr urine test in pregnancy. Patient states she feels fetal movement, but its less strong that it previously was.   She also denies contractions, leakage of fluid, or vaginal bleeding.     ANTE: Spontaneous pregnancy.   NIPT WNL.   Increased risk for Trisomy 21 on NT--> Normal amniocentesis (karyotype and CMA).   Anatomy WNL. Passed GCT.   No HTN or Thyroid issues in pregnancy.   EFW: 361grams on anatomy Scan 3/22/24.     Obhx: G1 -  - SAB  G2 - 2019 - [facility] - Medical   G3 - pC/S for breech + Oligo @ 36w  G4 - SAB  G5 - current  Gynhx: Denies fibroids, cysts; + + past hx of abnormal pap w/ cryo. 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   - Ofirmev and 1000 LR bolus for HA   - Full labs sent   - Continuous fetal and TOCO monitoring   - tracing reactive and reassuring - will continue to monitor    Dale Henley PA-C   Discussed w/ Dr. Cohen  Pt is a 44y  @ 27w0d presenting w/ headache and decreased fetal movement since Friday.  Patient states she did not take any medication for her headache. She denies associated changes in vision, SOB/CP, RUQ pain. pt endorses some minor new onset feet swelling. Denies having done a 24hr urine test in pregnancy. Patient states she feels fetal movement, but its less strong that it previously was. Pt notes that they are painting something right outside her building and she has been smelling the intense paint smell - not sure if headache is from that.   She also denies contractions, leakage of fluid, or vaginal bleeding.     ANTE: Spontaneous pregnancy.   NIPT WNL.   Increased risk for Trisomy 21 on NT--> Normal amniocentesis (karyotype and CMA).   Anatomy WNL. Passed GCT.   No HTN or Thyroid issues in pregnancy.   EFW: 361grams on anatomy Scan 3/22/24.     Obhx: G1 - 2018 - SAB  G2 - 2019 - [facility] - Medical   G3 - pC/S for breech + Oligo @ 36w  G4 - SAB  G5 - current  Gynhx: Denies fibroids, cysts; + + past hx of abnormal pap w/ cryo. 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 Reassuring.      A/P: Pt is a 44y  @ 27w0d presenting w/ HA and decreased FM.   - Ofirmev and 1000 LR bolus for HA   - Full labs sent   - Continuous fetal and TOCO monitoring   - tracing reactive and reassuring - will continue to monitor    Dale Henley PA-C   Discussed w/ Dr. Cohen     Addendum:   - Patient feels better and was sleeping. She said headache went from 7/10 to 4/10, but she still has a headache. Will order Reglan.   - Patient will be sent home with 24 hour urine test   - Patient's labs are remarkable for PC 0.3 - but she does not have any concerning BPs or other lab values, will continue to monitor for PEC.   - Patient has been feeling the baby move and feels reassured  - Pt was counseled extensively on PEC and fetal movment and will continue routine outpatient follow up   - Patient is safe to go home when headache resolves  Pt is a 44y  @ 27w0d presenting w/ headache and decreased fetal movement since Friday.  Patient states she did not take any medication for her headache. She denies associated changes in vision, SOB/CP, RUQ pain. pt endorses some minor new onset feet swelling. Denies having done a 24hr urine test in pregnancy. Patient states she feels fetal movement, but its less strong that it previously was. Pt notes that they are painting something right outside her building and she has been smelling the intense paint smell - not sure if headache is from that.   She also denies contractions, leakage of fluid, or vaginal bleeding.     ANTE: Spontaneous pregnancy.   NIPT WNL.   Increased risk for Trisomy 21 on NT--> Normal amniocentesis (karyotype and CMA).   Anatomy WNL. Passed GCT.   No HTN or Thyroid issues in pregnancy.   EFW: 361grams on anatomy Scan 3/22/24.     Obhx: G1 - 2018 - SAB  G2 - 2019 - [facility] - Medical   G3 - pC/S for breech + Oligo @ 36w  G4 - SAB  G5 - current  Gynhx: Denies fibroids, cysts; + + past hx of abnormal pap w/ cryo. 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 Reassuring.      A/P: Pt is a 44y  @ 27w0d presenting w/ HA and decreased FM.   - Ofirmev and 1000 LR bolus for HA   - Full labs sent   - Continuous fetal and TOCO monitoring   - tracing reactive and reassuring - will continue to monitor    Dale Henley PA-C   Discussed w/ Dr. Cohen     Addendum:   - Patient feels better and was sleeping. She said headache went from 7/10 to 4/10, but she still has a headache. Will order Reglan.   - Patient will be sent home with 24 hour urine test   - Patient's labs are remarkable for PC 0.3 - but she does not have any concerning BPs or other lab values, will continue to monitor for PEC.   - Patient has been feeling the baby move and feels reassured  - Pt was counseled extensively on PEC and fetal movment and will continue routine outpatient follow up   - Patient is safe to go home when headache resolves     **Addendum:  - headache improved with reglan   - patient is safe for dc w/ follow up and 24 hour urine  - tracing remains reactive and reassuring   - pt endorse good fetal movement  - return precautions given for PEC and decreased FM  Pt is a 44y  @ 27w0d presenting w/ headache and decreased fetal movement since Friday.  Patient states she did not take any medication for her headache. She denies associated changes in vision, SOB/CP, RUQ pain. pt endorses some minor new onset feet swelling. Denies having done a 24hr urine test in pregnancy. Patient states she feels fetal movement, but its less strong that it previously was. Pt notes that they are painting something right outside her building and she has been smelling the intense paint smell - not sure if headache is from that.   She also denies contractions, leakage of fluid, or vaginal bleeding.     ANTE: Spontaneous pregnancy.   NIPT WNL.   Increased risk for Trisomy 21 on NT--> Normal amniocentesis (karyotype and CMA).   Anatomy WNL. Passed GCT.   No HTN or Thyroid issues in pregnancy.   EFW: 361grams on anatomy Scan 3/22/24.     Obhx: G1 - 2018 - SAB  G2 - 2019 - [facility] - Medical   G3 - pC/S for breech + Oligo @ 36w  G4 - SAB  G5 - current  Gynhx: Denies fibroids, cysts; + + past hx of abnormal pap w/ cryo. 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 Reassuring.  Ultrasound attached    A/P: Pt is a 44y  @ 27w0d presenting w/ HA and decreased FM.   - Ofirmev and 1000 LR bolus for HA   - Full labs sent   - Continuous fetal and TOCO monitoring   - tracing reactive and reassuring - will continue to monitor    Dale Henley PA-C   Discussed w/ Dr. Cohen     Addendum:   - Patient feels better and was sleeping. She said headache went from 7/10 to 4/10, but she still has a headache. Will order Reglan.   - Patient will be sent home with 24 hour urine test   - Patient's labs are remarkable for PC 0.3 - but she does not have any concerning BPs or other lab values, will continue to monitor for PEC.   - Patient has been feeling the baby move and feels reassured  - Pt was counseled extensively on PEC and fetal movment and will continue routine outpatient follow up   - Patient is safe to go home when headache resolves     **Addendum:  - headache improved with reglan   - patient is safe for dc w/ follow up and 24 hour urine  - tracing remains reactive and reassuring   - pt endorse good fetal movement  - return precautions given for PEC and decreased FM

## 2024-05-06 NOTE — OB PROVIDER TRIAGE NOTE - NS_FINALEDD_OBGYN_ALL_OB_DT
05-Aug-2024
I have personally provided the amount of critical care time documented below excluding time spent on separate procedures

## 2024-05-06 NOTE — OB RN TRIAGE NOTE - NS_PARA_OBGYN_ALL_OB_NU
1 5-Fu Pregnancy And Lactation Text: This medication is Pregnancy Category X and contraindicated in pregnancy and in women who may become pregnant. It is unknown if this medication is excreted in breast milk.

## 2024-05-20 ENCOUNTER — ASOB RESULT (OUTPATIENT)
Age: 44
End: 2024-05-20

## 2024-05-20 ENCOUNTER — APPOINTMENT (OUTPATIENT)
Dept: ANTEPARTUM | Facility: CLINIC | Age: 44
End: 2024-05-20
Payer: COMMERCIAL

## 2024-05-20 PROBLEM — Z78.9 OTHER SPECIFIED HEALTH STATUS: Chronic | Status: ACTIVE | Noted: 2024-05-06

## 2024-05-20 PROCEDURE — 76820 UMBILICAL ARTERY ECHO: CPT | Mod: 59

## 2024-05-20 PROCEDURE — 76816 OB US FOLLOW-UP PER FETUS: CPT

## 2024-05-20 PROCEDURE — 76819 FETAL BIOPHYS PROFIL W/O NST: CPT

## 2024-06-17 ENCOUNTER — APPOINTMENT (OUTPATIENT)
Dept: ANTEPARTUM | Facility: CLINIC | Age: 44
End: 2024-06-17
